# Patient Record
(demographics unavailable — no encounter records)

---

## 2025-03-28 NOTE — ASSESSMENT
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  - education, counseling - might consider steroid sparing agent such as opzelura - patch testing as below  #ACD, possible American Core Series (80 patches) placed today Anticipatory guidance provided. Pt understands not to get back wet and to avoid scratching, sweating, and topicals on the back until the final read. OK to take antihistamines.  >45 min spent reviewing outside records, obtaining history and speaking with outside providers

## 2025-03-28 NOTE — HISTORY OF PRESENT ILLNESS
[FreeTextEntry1] : np [de-identified] : 53 yo M referred by Dr. Ramos Rayo for patch testing  Ongoing red burning rash x 2 years, started after wearing a different shirt brand with collar (normally wears Lands end). Since then continues to have this burning red rash. topial steroids help but only temporarily. Had an outside biopsy and told it showed "chronic inflammation". Has had limited patch testing (TRUE test likely) and had multiple positives including balsam of peru, benzoyl peroxide, desoximetasone and isoamyl-p-methoxycinnamate.   AMERICAN CORE SERIES  Patch #1: Medicaments Benzocaine Amerchol L-101 Neomycin Clobetasol-17-propionate Bacitracin Tixocortol-21-pivalate Budesonide Polymyxin B sulfate Lidocaine Propylene glycol   Patch #2: Cosmetics p-phenylenediamine (PPD) 2- hydroxyl- 4 methoxybenzophenone Cocamide HOLLAND Ethyl hexyl glycerol Dimethylaminopropylamine      2- ethylhexyl- 4 methoxycinnamate      Sorbitan sesquioleate Benzophenon Lauryl Glycoside Oleamidopropyl dimethylamine  Patch #3: Cosmetics / Medicaments / Preservative Tocopherol Benzyl salicylate Chlorhexidine digluconate Benzyl alcohol Amidoamine Cocoamidopropyl betaine Decyl glucoside Cetylstearyl alcohol 4 -Chloro-3 cresol Methylisothiazolinone, Methylchloroisothiazolinone (MI, MCI)  Patch #4: Preservatives Quaternium-15 Imidazolidinyl urea Diazolidinyl urea Paraben mix Methyldibromo glutaronitrile 2-tert-butyl-4-methoxyphenol (BHA) Iodopropynyl butylcarbamate Formaldehyde Methylisothiazolinone (MI) DMDM hydantoin  Patch #5: Fragrance Cinnamal [place] Balsum Fragrance mix I Fragrance mix II Tea tree oil Lavender Oil Hydroxyisohexyl 3-cyclohexene carboxaldehyde Miriam oil Peppermint oil [place]anga odorata (Ylang ylang oil)  Patch #6: Dyes / Rubber Disperse orange 3 Black Rubber mix Disperse yellow 3 Disperse blue mix 2-Mercaptobenzothiazole (MBT) Carba mix Thiuram mix Mercapto mix 1,3-diphenylguanidine Mixed dialkyl thiourea  Patch #7: Resins / Acrylates / Plants Colophonium Epoxy resin, Bisphenol A 1-afpx-hsgwhbzrhorecylbrzduhnl resin (PTBP) Propolis Shellac Toluenesulfonamide formaldehyde resin Ethyl acrylate Methyl methacrylate 2-hydroxyethyl methacrylate (HEMA) Sesquiterpene lactone mix  Patch #8: Industrial / Metals / Plants/other Ethylenediamine dihydrochloride [place] 2-bromo-4-nitropropane-1,3-diol (Bronopol) Triamcinolone Chloroxylenol (PCMX) Potassium dichromate Nickel (II) sulfate hexahydrate Gold (I) sodium thiosulfate dihydrate Cobalt (II) chloride hexahydrate Compositae mix II

## 2025-03-30 NOTE — ASSESSMENT
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  - education, counseling - might consider steroid sparing agent such as opzelura - patch testing as below  #ACD, possible American Core Series (80 patches) removed today ?fragrance mix 1, black rubbermix Anticipatory guidance provided. Pt understands not to get back wet and to avoid scratching, sweating, and topicals on the back until the final read. OK to take antihistamines. .apcth3

## 2025-03-30 NOTE — HISTORY OF PRESENT ILLNESS
[FreeTextEntry1] : np [de-identified] : 53 yo M referred by Dr. Ramos Rayo for patch testing  Ongoing red burning rash x 2 years, started after wearing a different shirt brand with collar (normally wears [place] end). Since then continues to have this burning red rash. topial steroids help but only temporarily. Had an outside biopsy and told it showed "chronic inflammation". Has had limited patch testing (TRUE test likely) and had multiple positives including balsam of peru, benzoyl peroxide, desoximetasone and isoamyl-p-methoxycinnamate.   AMERICAN CORE SERIES  Patch #1: Medicaments Benzocaine Amerchol L-101 Neomycin Clobetasol-17-propionate Bacitracin Tixocortol-21-pivalate Budesonide Polymyxin B sulfate Lidocaine Propylene glycol   Patch #2: Cosmetics p-phenylenediamine (PPD) 2- hydroxyl- 4 methoxybenzophenone Cocamide HOLLAND Ethyl hexyl glycerol Dimethylaminopropylamine      2- ethylhexyl- 4 methoxycinnamate      Sorbitan sesquioleate Benzophenon Lauryl Glycoside Oleamidopropyl dimethylamine  Patch #3: Cosmetics / Medicaments / Preservative Tocopherol Benzyl salicylate Chlorhexidine digluconate Benzyl alcohol Amidoamine Cocoamidopropyl betaine Decyl glucoside Cetylstearyl alcohol 4 -Chloro-3 cresol Methylisothiazolinone, Methylchloroisothiazolinone (MI, MCI)  Patch #4: Preservatives Quaternium-15 Imidazolidinyl urea Diazolidinyl urea Paraben mix Methyldibromo glutaronitrile 2-tert-butyl-4-methoxyphenol (BHA) Iodopropynyl butylcarbamate Formaldehyde Methylisothiazolinone (MI) DMDM hydantoin  Patch #5: Fragrance Cinnamal [place] Balsum Fragrance mix I Fragrance mix II Tea tree oil Lavender Oil Hydroxyisohexyl 3-cyclohexene carboxaldehyde Miriam oil Peppermint oil [place] odorata (Ylang ylang oil)  Patch #6: Dyes / Rubber Disperse orange 3 Black Rubber mix Disperse yellow 3 Disperse blue mix 2-Mercaptobenzothiazole (MBT) Carba mix Thiuram mix Mercapto mix 1,3-diphenylguanidine Mixed dialkyl thiourea  Patch #7: Resins / Acrylates / Plants Colophonium Epoxy resin, Bisphenol A 2-kjvz-ayhacaqpmtntongmasvdhty resin (PTBP) Propolis Shellac Toluenesulfonamide formaldehyde resin Ethyl acrylate Methyl methacrylate 2-hydroxyethyl methacrylate (HEMA) Sesquiterpene lactone mix  Patch #8: Industrial / Metals / Plants/other Ethylenediamine dihydrochloride [place] 2-bromo-4-nitropropane-1,3-diol (Bronopol) Triamcinolone Chloroxylenol (PCMX) Potassium dichromate Nickel (II) sulfate hexahydrate Gold (I) sodium thiosulfate dihydrate Cobalt (II) chloride hexahydrate Compositae mix II

## 2025-04-07 NOTE — ASSESSMENT
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  Final Patch Testing (American Core Series) results reviewed today: +fragrance 1, ++carmine, ?cobalt, ?gold - education, counseling - extensive counseling, narrative sheets previously provided and SAFE list emailed - START pimecrolimus cream daily. SED - START Diclofenac (or otc voltaren if not covered by insurance) --START [facility] compounded lidocaine, gabapentin, amitriptyline cream daily  --Discussed oral meds with gabapentin. Discussed that if not improving, may call to request gabapentin, can send gabapentin 200mg daily  --consider as grand round case in future  >45 min assessing pt, explaing possible diagnosis, interpreting test results and communicating with outside provieders

## 2025-04-07 NOTE — HISTORY OF PRESENT ILLNESS
[FreeTextEntry1] : JAH [de-identified] : 51 yo M referred by Dr. Ramos Rayo for patch testing  Ongoing red burning rash x 2 years, started after wearing a different shirt brand with collar (normally wears [place] end). Since then continues to have this burning red rash. topial steroids help but only temporarily. Had an outside biopsy and told it showed "chronic inflammation". Has had limited patch testing (TRUE test likely) and had multiple positives including balsam of peru, benzoyl peroxide, desoximetasone and isoamyl-p-methoxycinnamate.  Home meds: sertraline (10+ years), Montelukast, finasteride, Levocetirizine  Since LV, continuing to experience burning pain and redness on face and neck. Cold towel helps with burning

## 2025-04-07 NOTE — HISTORY OF PRESENT ILLNESS
[FreeTextEntry1] : JAH [de-identified] : 53 yo M referred by Dr. Ramos Rayo for patch testing  Ongoing red burning rash x 2 years, started after wearing a different shirt brand with collar (normally wears [place] end). Since then continues to have this burning red rash. topial steroids help but only temporarily. Had an outside biopsy and told it showed "chronic inflammation". Has had limited patch testing (TRUE test likely) and had multiple positives including balsam of peru, benzoyl peroxide, desoximetasone and isoamyl-p-methoxycinnamate.  Home meds: sertraline (10+ years), Montelukast, finasteride, Levocetirizine  Since LV, continuing to experience burning pain and redness on face and neck. Cold towel helps with burning

## 2025-05-02 NOTE — HISTORY OF PRESENT ILLNESS
[FreeTextEntry1] : RPV dermatitis  [de-identified] : 53 yo M here for fu of below   Initially referred by Dr. Ramos Rayo for patch testing  Ongoing red burning rash x 2 years, started after wearing a different shirt brand with collar (normally wears [place] end). Since then continues to have this burning red rash. topial steroids help but only temporarily. Had an outside biopsy and told it showed "chronic inflammation". Has had limited patch testing (TRUE test likely) and had multiple positives including balsam of peru, benzoyl peroxide, desoximetasone and isoamyl-p-methoxycinnamate. Was re-patch tested here with reactions to +fragrance 1, ++carmine, ?cobalt, ?gold  Since LV, continuing to experience burning pain and redness on face and neck. Taking gabapentin 200mg qAM with temporary relief. Could not tolerate diclofenac or pimecrolimus due to burning. Not using [facility] compounded cream. Cold towels and air conditioning help with burning   Home meds: sertraline (10+ years), famotidine, Montelukast, finasteride, Levocetirizine

## 2025-05-02 NOTE — HISTORY OF PRESENT ILLNESS
[FreeTextEntry1] : RPV dermatitis  [de-identified] : 53 yo M here for fu of below   Initially referred by Dr. Ramos Rayo for patch testing  Ongoing red burning rash x 2 years, started after wearing a different shirt brand with collar (normally wears [place] end). Since then continues to have this burning red rash. topial steroids help but only temporarily. Had an outside biopsy and told it showed "chronic inflammation". Has had limited patch testing (TRUE test likely) and had multiple positives including balsam of peru, benzoyl peroxide, desoximetasone and isoamyl-p-methoxycinnamate. Was re-patch tested here with reactions to +fragrance 1, ++carmine, ?cobalt, ?gold  Since LV, continuing to experience burning pain and redness on face and neck. Taking gabapentin 200mg qAM with temporary relief. Could not tolerate diclofenac or pimecrolimus due to burning. Not using [facility] compounded cream. Cold towels and air conditioning help with burning   Home meds: sertraline (10+ years), famotidine, Montelukast, finasteride, Levocetirizine

## 2025-05-02 NOTE — PHYSICAL EXAM
[FreeTextEntry3] : blanching erythema and poikiloderma on neck, sparing of the folds  erythematous papules on forehead and cheeks centrofacial erythema with telengectasias

## 2025-05-02 NOTE — ASSESSMENT
[FreeTextEntry1] : #Dermatitis, chronic, neck, flaring Outside biopsy at [facility] 5/7/2024 showed lentiginous junctional melanocytic nevus, irritated  3/12/2025 biopsy showing poikiloderma  Outside patch testing (~30-40 patches): +balsam of peru, +benzoyl peroxide, +desoxi, +isoamyl-p-methoxycinnamate.  Internal final Patch Testing (American Core Series) results: +fragrance 1, ++carmine, ?cobalt, ?gold  Ddx photo-mediated dermatitis including phototoxic eruption (possible drug culprit - sertraline?) vs atypical erythromelalgia vs rosacea (especially on face) vs component of poikiloderma   Plan:  - education, counseling - emphasized importance of sun protection with SPF 30+, broad-spectrum sensitive sunscreen (eg. Blue Lizard)   - Increase gabapentin from 200mg daily to 200mg TID. SED - Start [facility] compounded gabapentin, amitriptyline, and lidocaine cream. SED   - discussed utility of re-biopsy today. pt defers but will consider in future  - can consider starting PO med for erythromelalgia such as a calcium channel blocker in future - can consider PDL treatment for erythema   - will plan to bring to next grand rounds May 2025   RTC grand rounds   >45 min

## 2025-06-26 NOTE — ASSESSMENT
[FreeTextEntry1] : #Dermatitis, chronic, neck, flaring Outside biopsy at [facility] 5/7/2024 showed lentiginous junctional melanocytic nevus, irritated  3/12/2025 biopsy showing poikiloderma  Outside patch testing (~30-40 patches): +balsam of peru, +benzoyl peroxide, +desoxi, +isoamyl-p-methoxycinnamate.  Internal final Patch Testing (American Core Series) results: +fragrance 1, ++carmine, ?cobalt, ?gold  Ddx photo-mediated dermatitis including phototoxic eruption (possible drug culprit - sertraline?) vs atypical erythromelalgia vs rosacea (especially on face) vs component of poikiloderma   Plan:  - education, counseling - emphasized importance of sun protection with SPF 30+, broad-spectrum sensitive sunscreen (eg. Blue Lizard)   - gabapentin from 200mg daily to 200mg TID. SED - discussed utility of re-biopsy today. pt defers but will consider in future  - can consider starting PO med for erythromelalgia such as a calcium channel blocker in future - PDL as below  Jordana V Beam Pulsed Dye Laser Treatment #1  After detailed discussion of the risks, benefits and alternatives to treatment, informed written consent was obtained. Risks include, but are not limited to, pain, redness, swelling, bruising, scarring and dyspigmentation. Multiple treatments may be required to see improvement. Patient was recommended to avoid sun exposure for 2 weeks prior to and 2 weeks after laser treatment. Treatment area(s) was cleaned with alcohol prior to the procedure.  Treatment #: 1 Wavelength:595 nm  Pulse width: 10 ms Spot Size:  7 mm Fluence: 8 J/cm2  Cryogen: 30/20 # Pulses: 100 Location: neck, chin  Patient tolerated the procedure without any complications.  Sunscreen was applied to the treated area(s). Patient was instructed to protect the area from sun exposure and wear sunscreen for at least 2 weeks.  >45 min

## 2025-06-26 NOTE — HISTORY OF PRESENT ILLNESS
[FreeTextEntry1] : RPV dermatitis  [de-identified] : 51 yo M here for fu of below   Initially referred by Dr. Ramos Rayo for patch testing  Ongoing red burning rash x 2 years, started after wearing a different shirt brand with collar (normally wears [place] end). Since then continues to have this burning red rash. topial steroids help but only temporarily. Had an outside biopsy and told it showed "chronic inflammation". Has had limited patch testing (TRUE test likely) and had multiple positives including balsam of peru, benzoyl peroxide, desoximetasone and isoamyl-p-methoxycinnamate. Was re-patch tested here with reactions to +fragrance 1, ++carmine, ?cobalt, ?gold  Since LV, continuing to experience burning pain and redness on face and neck. Taking gabapentin 200mg qAM with temporary relief. Could not tolerate diclofenac or pimecrolimus due to burning. Not using [facility] compounded cream. 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   Here for PDL  Home meds: sertraline (10+ years), famotidine, Montelukast, finasteride, Levocetirizine